# Patient Record
(demographics unavailable — no encounter records)

---

## 2025-01-13 NOTE — HISTORY OF PRESENT ILLNESS
[de-identified] : Patient is a 67-year-old female who presents today for an evaluation regarding her left hip.  She states that she has had some on and off pain for the last 6 to 8 months.  And describes it as a discomfort which is usually worse by the end of the day.  At times the pain may be significant on other days the pain may be the subsided.  He said she states that it is a radiating pain which can radiate down into the thigh.  But more so with in the anterior aspect of the hip/groin.  She states that it is also can be a dull ache which is worse at nights and while sleeping.  She does state that she has a decreased mobility.  She did seek medical attention's where x-rays as well as a MRI was performed of her pelvis.  She was explained that she does have some osteoarthritic changes of the hip but also some lower back discomfort with impingement as well.  She states that she is unsure whether her pain is stemming from her back or her hip.  And presents today for an evaluation.  She denies any specific injury or accident.

## 2025-01-13 NOTE — PHYSICAL EXAM
[de-identified] : On physical examination of the left hip.  The skin is clean dry and intact no areas of redness swelling or heat noted.  There was some pain and tenderness within the hip radiating mostly towards the groin.  This is worse with internal and external rotation.  When evaluating the range of motion.  There is a diffuse decrease when compared to the opposite side.  Particularly with internal rotation as well as some external rotation.  There is no evidence of limb length discrepancy.  No evidence of any muscle atrophy.  No evidence of any motor or sensory deficit.  Patient has good distal pulses with no calf tenderness.  She also has a negative straight leg Tinel's and was a test.  With no pain or tenderness radiating along the midline or paraspinal muscles of the lower back. [de-identified] : No x-rays were taken here in the office.  However they were reviewed from over a month ago.  Which were taken at [facility].  It shows osteoarthritic changes with narrowing of the joint spacing particularly at the superior pole.  This is seen in both the AP pelvis as well as the lateral hip.  No evidence of any fracture or dislocation.

## 2025-01-13 NOTE — DISCUSSION/SUMMARY
[de-identified] : After thorough history full examination and review of the x-rays.  It was explained to the patient that she does have osteoarthritic changes within the left hip.  She was explained the different modalities of treatment which include conservative versus surgical intervention.  She was also advised that she can try a intra-articular cortisone injection.  She was explained that with the arthritic changes there is that discomfort which can occur in other areas such her lower back or even in her legs/knees.  Has an interest direct discomfort.  She was explained that in the foreseeable future she will be a candidate for a left total hip replacement.  However since she is managing her pain and that it weans from being significant to mild.  That she should also continue with conservative measures.  This would include an exercise program both at home and as well as with her physical therapy.  She is also encouraged to use ice packs/moist heat and pain medication when needed.  Patient was advised that she could also try anti-inflammatories.  However she does have a gastric sleeve and therefore it is first recommended to try the topicals which she states she has at home and.  Versus the p.o. medication.  But if the patient does choose to try the p.o. medication.  She may try Mobic, Celebrex or even the Naprosyn EC.  She will call the office at a later time to see if she would like to add this to her management.  She was also advised that she can try a intra-articular cortisone injection given via ultrasound or x-ray.  To use for a therapeutic as well as diagnostic basis.  She was explained the risk benefits pros and cons of the injection as well as alternatives.  She was also explained that this should be done either through ultrasound or fluoroscopy to ensure proper placement of the injection.  However the patient states that this present time that she would like to with not received a cortisone injection at this present time.  But will notify the office if she does choose to change her mind.  She was also explained that she should continue with this conservative management but if the pain persists or even worsens.  She should return back to the office to discuss further medical management including a possible left total hip replacement with Dr. Renee  45 minutes were spent, face to face, in direct consultation with the patient. This includes reviewing the natural history of their Dx., eliciting the history, performing an orthopedic exam, review of the x-ray findings, forming a differential Dx and discussing all treatment options. This Includes both surgical and non-surgical treatments. I also reviewed all the risks and benefits of non-operative & operative Tx options, future impact into orthopedic functions/problems, activity restrictions both at home and at work, and all follow up requirements.